# Patient Record
Sex: MALE | Race: OTHER | Employment: STUDENT | URBAN - METROPOLITAN AREA
[De-identification: names, ages, dates, MRNs, and addresses within clinical notes are randomized per-mention and may not be internally consistent; named-entity substitution may affect disease eponyms.]

---
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
## 2024-12-28 ENCOUNTER — HOSPITAL ENCOUNTER (EMERGENCY)
Age: 19
Discharge: HOME | End: 2024-12-28
Payer: COMMERCIAL

## 2024-12-28 VITALS
HEART RATE: 58 BPM | SYSTOLIC BLOOD PRESSURE: 136 MMHG | WEIGHT: 175.04 LBS | TEMPERATURE: 97.9 F | HEIGHT: 76 IN | BODY MASS INDEX: 21.32 KG/M2 | DIASTOLIC BLOOD PRESSURE: 67 MMHG | RESPIRATION RATE: 16 BRPM | OXYGEN SATURATION: 98 %

## 2024-12-28 DIAGNOSIS — Z20.2 EXPOSURE TO CHLAMYDIA: Primary | ICD-10-CM

## 2024-12-28 DIAGNOSIS — A59.9 TRICHOMONAS INFECTION: ICD-10-CM

## 2024-12-28 LAB
SPECIMEN TYPE: ABNORMAL
TRICHOMONAS VAGINALIS SCREEN: POSITIVE

## 2024-12-28 PROCEDURE — 87808 TRICHOMONAS ASSAY W/OPTIC: CPT

## 2024-12-28 PROCEDURE — 87591 N.GONORRHOEAE DNA AMP PROB: CPT

## 2024-12-28 PROCEDURE — 87491 CHLMYD TRACH DNA AMP PROBE: CPT

## 2024-12-28 PROCEDURE — 99283 EMERGENCY DEPT VISIT LOW MDM: CPT

## 2024-12-28 RX ORDER — DOXYCYCLINE HYCLATE 100 MG
100 TABLET ORAL 2 TIMES DAILY
Qty: 14 TABLET | Refills: 0 | Status: SHIPPED | OUTPATIENT
Start: 2024-12-28 | End: 2025-01-04

## 2024-12-28 RX ORDER — METRONIDAZOLE 500 MG/1
2000 TABLET ORAL ONCE
Qty: 4 TABLET | Refills: 0 | Status: SHIPPED | OUTPATIENT
Start: 2024-12-28 | End: 2024-12-28

## 2024-12-28 ASSESSMENT — LIFESTYLE VARIABLES
HOW OFTEN DO YOU HAVE A DRINK CONTAINING ALCOHOL: NEVER
HOW MANY STANDARD DRINKS CONTAINING ALCOHOL DO YOU HAVE ON A TYPICAL DAY: PATIENT DOES NOT DRINK

## 2024-12-28 ASSESSMENT — PAIN - FUNCTIONAL ASSESSMENT: PAIN_FUNCTIONAL_ASSESSMENT: NONE - DENIES PAIN

## 2024-12-28 NOTE — ED TRIAGE NOTES
Pt arrives ambulatory for eval of std after gf tested positive for chlamydia, denies symptoms.  Pt is a/ox4, rsp nonlabored and skin race appropriate,warm and dry.

## 2024-12-28 NOTE — ED PROVIDER NOTES
[facility] EMERGENCY DEPARTMENT  EMERGENCY DEPARTMENT ENCOUNTER        Pt Name: Namita Lu  MRN: [id number]  Birthdate 2005  Date of evaluation: 12/28/2024  Provider: Lucia Navarro PA-C  PCP: No primary care provider on file.  Note Started: 4:35 PM EST 12/28/24      SEGUNDO. I have evaluated this patient.        CHIEF COMPLAINT       Chief Complaint   Patient presents with    Exposure to STD     Pt arrives ambulatory for eval of std after gf tested positive for chlamydia, denies symptoms       HISTORY OF PRESENT ILLNESS: 1 or more Elements     History From: patient    Namita Friend is a 19 y.o. male who presents for exposure to chlamydia.  Reports girlfriend tested positive for chlamydia.  Patient denies any symptoms.  He denies dysuria, penile discharge, genital sores or rashes, testicular pain, abdominal pain, fever.  Denies any health problems.    Nursing Notes were reviewed and agreed with or any disagreements were addressed in the HPI.    REVIEW OF SYSTEMS :      Review of Systems    Positives and Pertinent negatives as per HPI.     SURGICAL HISTORY   History reviewed. No pertinent surgical history.    CURRENTMEDICATIONS       Discharge Medication List as of 12/28/2024  4:38 PM        CONTINUE these medications which have NOT CHANGED    Details   naproxen (NAPROSYN) 500 MG tablet Take 1 tablet by mouth 2 times daily, Disp-20 tablet, R-0Print             ALLERGIES     Patient has no known allergies.    FAMILYHISTORY     History reviewed. No pertinent family history.     SOCIAL HISTORY       Social History     Tobacco Use    Smoking status: Never    Smokeless tobacco: Never   Vaping Use    Vaping status: Every Day   Substance Use Topics    Alcohol use: Yes    Drug use: Yes     Types: Marijuana (Weed)       SCREENINGS        Jose G Coma Scale  Eye Opening: Spontaneous  Best Verbal Response: Oriented  Best Motor Response: Obeys commands  Jose G Coma Scale Score: 15              MEDICAL HISTORY      has no past medical history on file.     EMERGENCY DEPARTMENT COURSE and DIFFERENTIAL DIAGNOSIS/MDM:   Vitals:    Vitals:    12/28/24 1623   BP: 136/67   Pulse: 58   Resp: 16   Temp: 97.9 °F (36.6 °C)   TempSrc: Oral   SpO2: 98%   Weight: 79.4 kg (175 lb 0.7 oz)   Height: 1.93 m (6' 4\")       Patient was given the following medications:  Medications - No data to display          Patient was nontoxic, well appearing, with normal vital signs.  He presents for exposure to chlamydia.  He is asymptomatic.  On exam he is sitting in stretcher in no distress.  Appears very well and comfortable.  Will send urine test.    Records reviewed:  -Reviewed PCP note from visit 2003-seen for immunizations. no past medical history    Differential diagnosis includes STD, other    Given his exposure to chlamydia, will treat empirically with doxycycline.  Trichomonas will be sent.  He prefers to not wait for result.  Will call if positive.  Instructed to abstain from sex for 14 days and to follow-up with PCP or health department for further testing for syphilis, HIV, hepatitis if desired.  He understood.    trich resulted positive.  I called patient and notified him of result and need for treatment.  Sent flagyl to his pharmacy          Chronic Conditions:       I am the Primary Clinician of Record.    Is this patient to be included in the SEP-1 Core Measure due to severe sepsis or septic shock?   No   Exclusion criteria - the patient is NOT to be included for SEP-1 Core Measure due to:  2+ SIRS criteria are not met    PROCEDURES   Unless otherwise noted below, none     Procedures    FINAL IMPRESSION      1. Exposure to chlamydia    2. Trichomonas infection          DISPOSITION/PLAN       DISPOSITION Decision To Discharge 12/28/2024 04:35:29 PM   DISPOSITION CONDITION Stable           PATIENT REFERRED TO:  [facility]  [address]  [address]  [place]

## 2024-12-28 NOTE — DISCHARGE INSTRUCTIONS
-No sex for 14 days to get medicine time to work  -Follow-up with health department or primary care doctor for testing for syphilis, HIV, hepatitis if desired

## 2024-12-30 LAB
C TRACH DNA UR QL NAA+PROBE: NEGATIVE
N GONORRHOEA DNA UR QL NAA+PROBE: NEGATIVE